# Patient Record
Sex: MALE | Race: WHITE | NOT HISPANIC OR LATINO | Employment: OTHER
[De-identification: names, ages, dates, MRNs, and addresses within clinical notes are randomized per-mention and may not be internally consistent; named-entity substitution may affect disease eponyms.]

---

## 2018-10-09 ENCOUNTER — RECORDS - HEALTHEAST (OUTPATIENT)
Dept: ADMINISTRATIVE | Facility: OTHER | Age: 63
End: 2018-10-09

## 2023-03-02 ENCOUNTER — HOSPITAL ENCOUNTER (EMERGENCY)
Facility: HOSPITAL | Age: 68
Discharge: HOME | End: 2023-03-02
Attending: EMERGENCY MEDICINE | Admitting: EMERGENCY MEDICINE
Payer: MEDICARE

## 2023-03-02 VITALS
SYSTOLIC BLOOD PRESSURE: 112 MMHG | DIASTOLIC BLOOD PRESSURE: 67 MMHG | OXYGEN SATURATION: 98 % | TEMPERATURE: 96.9 F | HEART RATE: 63 BPM | RESPIRATION RATE: 16 BRPM | WEIGHT: 220.2 LBS

## 2023-03-02 DIAGNOSIS — T14.8XXA BLEEDING FROM WOUND: ICD-10-CM

## 2023-03-02 PROCEDURE — 99282 EMERGENCY DEPT VISIT SF MDM: CPT

## 2023-03-03 NOTE — ED NOTES
Patient discharged from ED. Discharge paperwork discussed, no questions at this time and patient agreeable about discharge, AVS in hand.

## 2023-03-03 NOTE — ED PROVIDER NOTES
EMERGENCY DEPARTMENT ENCOUNTER      NAME: Trey Corey  AGE: 67 year old male  YOB: 1955  MRN: [id number]  EVALUATION DATE & TIME: No admission date for patient encounter.    PCP: Ramiro German    ED PROVIDER: Pierce Coleman M.D.      Chief Complaint   Patient presents with     Post-op Problem         FINAL IMPRESSION:  1. Bleeding from wound          ED COURSE & MEDICAL DECISION MAKIN year old male presents to the Emergency Department for evaluation of post-op problem.  Wound bleeding.  Patient had an ablation procedure at [facility] within the last 24 hours.  He presents with bleeding from his  right groin puncture site.  On exam the bleeding is currently minimal, appears venous, and has only been in an amount which basically saturated a single gauze.  It is easily controlled with pressure and minimal once I took the dressing off here.  I reapplied a new Tegaderm and gauze dressing.  I reviewed outside records from [facility] which appear to show that this was limited to venipuncture only so I would not expect any kind of complication like pseudoaneurysm requiring ultrasound imaging.  There is really no significant groin swelling and no tenderness.  I do not suspect any complication at this point but probably is having some persistent bleeding related to his anticoagulated status.  We discussed continued application of pressure and he was given some additional dressing supplies with plan to call his cardiologist tomorrow to review any ongoing concerns.  Patient was discharged in good condition.    At the conclusion of the encounter I discussed the results of all of the tests and the disposition. The questions were answered. The patient or family acknowledged understanding and was agreeable with the care plan.       Medical Decision Making    History:    Supplemental history from: Documented in chart, if applicable    External Record(s) reviewed: Documented in chart, if  applicable.    Work Up:    Chart documentation includes differential considered and any EKGs or imaging independently interpreted by provider, where specified.    In additional to work up documented, I considered the following work up: Documented in chart, if applicable.    External consultation:    Discussion of management with another provider: Documented in chart, if applicable    Complicating factors:    Care impacted by chronic illness: Heart Disease    Care affected by social determinants of health: N/A    Disposition considerations: Discharge. No recommendations on prescription strength medication(s). See documentation for any additional details.            MEDICATIONS GIVEN IN THE EMERGENCY:  Medications - No data to display    NEW PRESCRIPTIONS STARTED AT TODAY'S ER VISIT  Discharge Medication List as of 3/2/2023  9:56 PM             =================================================================    HPI    Per chart review, patient visited [facility] today, for an atrial fibrillation ablation. After surgery, discussed medication risks with patient.  Action plan for management of symptoms/side effects/complications requiring medical attention established and shared with patient/caregiver. No Warfarin.  Stable. Patient alert and orientated x 4. Independent with ADLs per baseline. Denied pain, N/V, SOB. RFV dsg C/D/I, no hematoma or ecchymosis noted. Belongings, discharge instructions and discharge medication (Omeprazole) taken with patient upon discharge. No other medical concerns.    Patient information was obtained from: Patient    Use of : N/A      Trey Corey is a 67 year old male with a pertinent history of BPH, CAD, acute left lumbar radiculopathy, paroxysmal atrial fibrillation, hyperlipidemia, and essential hypertension who presents to this ED by private car for evaluation of post-op problem.    Patient reports an onset of post-op problem which occured today,  after his atrial fibrillation ablation. His suture keeps gauzing out of his bandage. However, he was instructed by his provider to not take off the bandage for 24 hours post-operation.     Patient reports he currently is taking Xarelto.    Patient denies generalized pain. No other medical concerns are expressed at this time.      REVIEW OF SYSTEMS   All systems reviewed and negative except as noted in HPI.    PAST MEDICAL HISTORY:  History reviewed. No pertinent past medical history.    PAST SURGICAL HISTORY:  History reviewed. No pertinent surgical history.        CURRENT MEDICATIONS:    No current facility-administered medications for this encounter.     Current Outpatient Medications   Medication     amLODIPine (NORVASC) 10 MG tablet     aspirin 81 mg chewable tablet     finasteride (PROPECIA) 1 mg tablet     fluocinonide (LIDEX) 0.05 % ointment     lisinopril (PRINIVIL,ZESTRIL) 40 MG tablet     metoprolol succinate (TOPROL-XL) 100 MG 24 hr tablet     rosuvastatin (CRESTOR) 20 MG tablet     triamcinolone (KENALOG) 0.1 % cream     ubidecarenone (COENZYME Q10) 60 mg cap         ALLERGIES:  No Known Allergies    FAMILY HISTORY:  Family History   Problem Relation Age of Onset     Heart Disease Father         ischemic heart disease     Early Death Father 36.00       SOCIAL HISTORY:   Social History     Socioeconomic History     Marital status:        VITALS:  /67   Pulse 63   Temp 96.9  F (36.1  C) (Temporal)   Resp 16   Wt 99.9 kg (220 lb 3.2 oz)   SpO2 98%     PHYSICAL EXAM    Constitutional: Well developed, Well nourished, NAD.  HENT: Normocephalic, Atraumatic. Neck Supple.  Eyes: EOMI, Conjunctiva normal.  Respiratory: Breathing comfortably on room air. Speaks full sentences easily. Lungs clear to ascultation.  Cardiovascular: Normal heart rate, Regular rhythm. No peripheral edema.  Abdomen: Soft, nontender  : There  are a few small venipuncture sites in the right groin.  1 of these threes  areas has minimal very slow oozing of venous blood.  There is no significant swelling, no palpable masses or deformity.  Femoral pulses are brisk and palpable.  Lower extremity is warm and well-perfused  Musculoskeletal: Good range of motion in all major joints. No major deformities noted.  Integument: Warm, Dry.  Neurologic: Alert & awake, Normal motor function, Normal sensory function, No focal deficits noted.   Psychiatric: Cooperative. Affect appropriate.         I, Pretty Kelly, am serving as a scribe to document services personally performed by Dr. Pierce Coleman, based on my observation and the provider's statements to me. I, Pierce Coleman MD attest that Sowmyatalia Leticia is acting in a scribe capacity, has observed my performance of the services and has documented them in accordance with my direction.    Pierce Coleman M.D.  Emergency Medicine  [facility] EMERGENCY DEPARTMENT  [address] [place] [id number] [phone]  Dept: [phone]     Pierce Coleman MD  03/02/23 [id number]

## 2023-03-03 NOTE — ED TRIAGE NOTES
Pt had an ablation today and got home around 1600. Around 1800, pt noticed blood on the gauze where they inserted the procedure (right groin). Triage told them not to change the gauze. Gauze is soaked in blood but not dripping.  Pt is on xarelto. No physical complaints.      Triage Assessment     Row Name 03/02/23 2009       Triage Assessment (Adult)    Airway WDL WDL       Respiratory WDL    Respiratory WDL WDL       Skin Circulation/Temperature WDL    Skin Circulation/Temperature WDL WDL       Cardiac WDL    Cardiac WDL WDL       Peripheral/Neurovascular WDL    Peripheral Neurovascular WDL WDL       Cognitive/Neuro/Behavioral WDL    Cognitive/Neuro/Behavioral WDL WDL

## 2023-03-03 NOTE — DISCHARGE INSTRUCTIONS
You were seen in the emergency department from bleeding from your right groin wound.  Your wound was redressed here and bleeding is minimal.  It should be fine for you to continue some gentle pressure at home and you were given some extra supplies to replace the dressing yourself if bleeding continues.  Please continue contact your clinic about any ongoing postprocedural concerns.  If you have any other immediate concerns like severe swelling in your groin, bleeding which cannot be controlled with pressure, etc. we can reevaluate anytime in the emergency department.